# Patient Record
Sex: MALE | Race: WHITE | NOT HISPANIC OR LATINO | Employment: UNEMPLOYED | URBAN - METROPOLITAN AREA
[De-identification: names, ages, dates, MRNs, and addresses within clinical notes are randomized per-mention and may not be internally consistent; named-entity substitution may affect disease eponyms.]

---

## 2017-10-16 ENCOUNTER — OFFICE VISIT (OUTPATIENT)
Dept: ORTHOPEDIC SURGERY | Facility: CLINIC | Age: 32
End: 2017-10-16

## 2017-10-16 VITALS — BODY MASS INDEX: 26.41 KG/M2 | WEIGHT: 195 LBS | HEIGHT: 72 IN

## 2017-10-16 DIAGNOSIS — M25.319 INSTABILITY OF SHOULDER JOINT, UNSPECIFIED LATERALITY: ICD-10-CM

## 2017-10-16 DIAGNOSIS — G89.29 CHRONIC PAIN OF BOTH SHOULDERS: Primary | ICD-10-CM

## 2017-10-16 DIAGNOSIS — M25.511 CHRONIC PAIN OF BOTH SHOULDERS: Primary | ICD-10-CM

## 2017-10-16 DIAGNOSIS — M25.512 CHRONIC PAIN OF BOTH SHOULDERS: Primary | ICD-10-CM

## 2017-10-16 PROCEDURE — 73030 X-RAY EXAM OF SHOULDER: CPT | Performed by: ORTHOPAEDIC SURGERY

## 2017-10-16 PROCEDURE — 99214 OFFICE O/P EST MOD 30 MIN: CPT | Performed by: ORTHOPAEDIC SURGERY

## 2017-10-16 RX ORDER — TRAMADOL HYDROCHLORIDE 50 MG/1
50 TABLET ORAL EVERY 4 HOURS PRN
Qty: 60 TABLET | Refills: 0 | Status: SHIPPED | OUTPATIENT
Start: 2017-10-16

## 2023-02-20 DIAGNOSIS — L23.7 POISON IVY DERMATITIS: Primary | ICD-10-CM

## 2024-10-30 NOTE — PROGRESS NOTES
"  Patient: Salazar Amanda Jr    YOB: 1985    Medical Record Number: [id number]    Chief Complaints:  Bilateral shoulder instability    History of Present Illness:     32 y.o. male patient who presents for follow-up of both shoulders.  When I last time, we had discussed a possible Latarjet for the right shoulder.  He decided against it at that time.  He is self-employed and simply could not take time off to recover from the surgery.  Since I last saw him, he tells me that both shoulders have gotten worse.  The left is especially bothersome at this point.  He has had multiple previous dislocations on the left side as well although he has never had surgery.  He tells me the left shoulder frequently \"feels like it slipping\".  He describes moderate, constant aching pain in both shoulders as well.  He has requested a referral to pain management for this.    Allergies: No Known Allergies    Home Medications:    Current Outpatient Prescriptions:   •  ibuprofen (ADVIL,MOTRIN) 800 MG tablet, Take 1 tablet by mouth Every 8 (Eight) Hours As Needed for moderate pain (4-6)., Disp: 50 tablet, Rfl: 0  •  traMADol (ULTRAM) 50 MG tablet, Take 1 tablet by mouth Every 4 (Four) Hours As Needed for Moderate Pain ., Disp: 60 tablet, Rfl: 0    Past Medical History:   Diagnosis Date   • Cough        Past Surgical History:   Procedure Laterality Date   • SHOULDER SURGERY Right 2007       Social History     Occupational History   • Not on file.     Social History Main Topics   • Smoking status: Current Every Day Smoker   • Smokeless tobacco: Current User   • Alcohol use No   • Drug use: No   • Sexual activity: Defer      Social History     Social History Narrative       History reviewed. No pertinent family history.    Review of Systems:      Constitutional: Denies fever, shaking or chills   Eyes: Denies change in visual acuity   HEENT: Denies nasal congestion or sore throat   Respiratory: Denies cough or shortness of breath " "  Cardiovascular: Denies chest pain or edema  Endocrine: Denies tremors, palpitations, intolerance of heat or cold, polyuria, polydipsia.  GI: Denies abdominal pain, nausea, vomiting, bloody stools or diarrhea  : Denies frequency, urgency, incontinence, retention, or nocturia.  Musculoskeletal: Denies numbness tingling or loss of motor function except as above  Integument: Denies rash, lesion or ulceration   Neurologic: Denies headache or focal weakness, deficits  Heme: Denies epistaxis, spontaneous or excessive bleeding, epistaxis, hematuria, melena, fatigue, enlarged or tender lymph nodes.      All other pertinent positives and negatives as noted above in HPI.    Physical Exam: 32 y.o. male  Vitals:    10/16/17 1147   Weight: 195 lb (88.5 kg)   Height: 72\" (182.9 cm)       General:  Patient is awake and alert.  Appears in no acute distress or discomfort.    Psych:  Affect and demeanor are appropriate.    Eyes:  Conjunctiva and sclera appear grossly normal.  Eyes track well and EOM seem to be intact.    Ears:  No gross abnormalities.  Hearing adequate for the exam.    Cardiovascular:  Regular rate and rhythm.    Lungs:  Good chest expansion.  Breathing unlabored.    Extremities: Both shoulders are examined.  On the right, he has a well-healed anterior surgical scar.  Mild tenderness anteriorly.  Full motion.  He has significant apprehension when placed into an abducted, external rotated position.  When I bring his arm down to the side, he remains apprehensive with external rotation.  This is somewhat relieved with a relocation maneuver.  Good strength in his deltoid and rotator cuff.  Intact sensation distally.  Palpable radial pulse.    On the left, skin about the shoulder appears benign.  No atrophy, swelling, or masses.  No tenderness.  Full motion.  Again, he is apprehensive in an abducted, externally rotated position.  This is relieved with a relocation maneuver.  Good motor and sensory function in the lower " arm and hand pain palpable radial pulse.    Imaging:  AP, scapular Y, and axillary views of the left shoulder are ordered by myself and reviewed to evaluate the patient's complaint.  No comparison films are immediately available.  The x-rays show no obvious acute abnormalities, lesions, masses, significant degenerative changes, or other concerning findings.  The acromiohumeral interval is normal.  Glenoid version appears normal as well.      Assessment/Plan:  Bilateral shoulder anterior instability    He tells me the left shoulder is more symptomatic for him at this point.  He is potentially a candidate for an arthroscopic repair on that side.  We discussed this in detail.  I will refer him for an MR arthrogram of the left shoulder with an eye towards possible arthroscopic stabilization.  I have instructed him to call for the results of that study and then we will come up with a plan.    With regards to the right shoulder, he needs a Latarjet.  We are going to defer that for now while refocused on the left shoulder.    I did agree to give him a limited prescription for tramadol to take for his discomfort.  The risks were discussed.  I have also agreed to refer him to pain management as per his request.    Prasanna Banda MD    10/16/2017       Yes